# Patient Record
Sex: MALE | Race: OTHER | HISPANIC OR LATINO | URBAN - METROPOLITAN AREA
[De-identification: names, ages, dates, MRNs, and addresses within clinical notes are randomized per-mention and may not be internally consistent; named-entity substitution may affect disease eponyms.]

---

## 2022-11-29 ENCOUNTER — EMERGENCY (EMERGENCY)
Facility: HOSPITAL | Age: 37
LOS: 1 days | Discharge: HOME | End: 2022-11-29
Attending: STUDENT IN AN ORGANIZED HEALTH CARE EDUCATION/TRAINING PROGRAM | Admitting: STUDENT IN AN ORGANIZED HEALTH CARE EDUCATION/TRAINING PROGRAM

## 2022-11-29 VITALS — HEART RATE: 101 BPM

## 2022-11-29 VITALS
RESPIRATION RATE: 18 BRPM | OXYGEN SATURATION: 99 % | DIASTOLIC BLOOD PRESSURE: 85 MMHG | HEART RATE: 140 BPM | TEMPERATURE: 99 F | SYSTOLIC BLOOD PRESSURE: 133 MMHG

## 2022-11-29 LAB
FLUAV AG NPH QL: SIGNIFICANT CHANGE UP
FLUBV AG NPH QL: SIGNIFICANT CHANGE UP
RSV RNA NPH QL NAA+NON-PROBE: DETECTED
SARS-COV-2 RNA SPEC QL NAA+PROBE: SIGNIFICANT CHANGE UP

## 2022-11-29 PROCEDURE — 71046 X-RAY EXAM CHEST 2 VIEWS: CPT | Mod: 26

## 2022-11-29 PROCEDURE — 99284 EMERGENCY DEPT VISIT MOD MDM: CPT

## 2022-11-29 RX ORDER — ALBUTEROL 90 UG/1
1 AEROSOL, METERED ORAL ONCE
Refills: 0 | Status: COMPLETED | OUTPATIENT
Start: 2022-11-29 | End: 2022-11-29

## 2022-11-29 RX ADMIN — ALBUTEROL 1 PUFF(S): 90 AEROSOL, METERED ORAL at 11:13

## 2022-11-29 NOTE — ED PROVIDER NOTE - NSFOLLOWUPINSTRUCTIONS_ED_ALL_ED_FT
Bronchospasm    WHAT YOU NEED TO KNOW:    Bronchospasm is a narrowing of the airway that usually comes and goes. You may be at risk for bronchospasm if you have a chest cold or allergies. You may also be at risk if you are bothered by air pollution, certain medicines, cold, dry air, smoke, or strong odors. Exercise may worsen your symptoms. Bronchospasms may make it hard for you to breathe. Severe bronchospasm may become life-threatening.    DISCHARGE INSTRUCTIONS:    Call your local emergency number (911 in the ) if:    You have chest pain.    You have severe shortness of breath or trouble breathing.  Return to the emergency department if:    You cough or spit up blood.    You are short of breath.    You have blue fingernails or toenails.    Your heartbeat is fast or not even.  Call your doctor or pulmonologist if:    You have a fever.    You have a cough that will not go away.    Your wheezing worsens.    You have questions or concerns about your condition or care.  Medicines: You may need any of the following:    Bronchodilators help expand your airway for easier breathing. Some of these medicines may help prevent future spasms.    Inhaled steroids help reduce swelling in your airway and soothe your breathing. These are used for long-term control.    Anticholinergics help relax and open your airway.    Take your medicine as directed. Contact your healthcare provider if you think your medicine is not helping or if you have side effects. Tell him of her if you are allergic to any medicine. Keep a list of the medicines, vitamins, and herbs you take. Include the amounts, and when and why you take them. Bring the list or the pill bottles to follow-up visits. Carry your medicine list with you in case of an emergency.  Prevent bronchospasms:    Avoid triggers. Your healthcare provider can help you identify your triggers. You may need to keep a diary of your symptoms. Include where you were and what you were doing when symptoms started. Also include how long symptoms lasted. Make a note of anything that helped or made your symptoms worse. Bring your diary to visits with your healthcare provider. He or she may also recommend skin prick tests or other tests to help find triggers.    Warm up before you exercise. Ask your healthcare provider about the best exercise plan for you.    Keep your immune system healthy. Try to avoid people who are sick. Ask your healthcare provider about vaccines you may need. Vaccines help prevent certain infections that can cause breathing problems. Get a flu vaccine as soon as recommended each year, usually in September or October. Vaccines are also available to prevent COVID-19 and pneumonia. Your provider can tell you if you also need other vaccines, and when to get them.    Breathe through your nose when you are in cold, dry air or weather. This may help reduce lung irritation by warming the air before it reaches your lungs.  Follow up with your doctor or pulmonologist as directed: You may need more testing to find the cause of your condition. Write down your questions so you remember to ask them during your visits.

## 2022-11-29 NOTE — ED PROVIDER NOTE - OBJECTIVE STATEMENT
Patient 37-year-old male with past medical story of asthma who is presenting to the emergency department with the past 3 days of cough, mild sore throat.  His classmate said he sounded like garbage, currently there are paramedics and he was given Decadron and 3 Combivent.  He feels much better now.  Took a COVID test yesterday was negative.  No fevers.  No other acute medical complaints.  Has an albuterol inhaler but says it is running low

## 2022-11-29 NOTE — ED PROVIDER NOTE - PHYSICAL EXAMINATION
CONSTITUTIONAL: Non-toxic, non-diaphoretic, in no apparent distress  HEAD: Normocephalic; atruamatic  EYES: EOM intact   ENMT: External appears normal; normal oropharynx, moist  NECK: grossly normal active ROM,  CARD: No cyanosis, good peripheral perfusion, RRR, no MRG  RESP: Normal chest excursion with respiration; no increased work of breathing, Mild end expiratory wheezing present  ABD: non distended  EXT: moving all extremities, no gross disfigurement or asymmetry,  SKIN: Warm, dry, no rash  NEURO:  moving all extremities, no facial droop, no dysarthria      cn2-12 intact

## 2022-11-29 NOTE — ED ADULT TRIAGE NOTE - CHIEF COMPLAINT QUOTE
pt with asthma, c/o exacerbation x 3 day with flu like symptoms. given 3 duonebs and decadron by EMS.

## 2022-11-29 NOTE — ED PROVIDER NOTE - PATIENT PORTAL LINK FT
You can access the FollowMyHealth Patient Portal offered by [facility] by registering at the following website: [email or web address]. By joining [facility] FollowMyHealth portal, you will also be able to view your health information using other applications (apps) compatible with our system.

## 2022-11-29 NOTE — ED PROVIDER NOTE - CLINICAL SUMMARY MEDICAL DECISION MAKING FREE TEXT BOX
Will obtain x-ray to rule out pneumonia, send flu/COVID, will give albuterol MDI.  Already treated with steroids and patient is well-appearing anticipate discharge.

## 2022-11-29 NOTE — ED PROVIDER NOTE - NS ED ROS FT
CONSTITUTIONAL: No fever,  Pulmonary: ++++ cough  CARDIOVASCULAR: No chest pain,  GI: No abdominal pain  MUSKULOSKELETAL: No new pain in joints/muscles  ALL OTHER SYSTEMS NEGATIVE.

## 2023-02-21 ENCOUNTER — NON-APPOINTMENT (OUTPATIENT)
Age: 38
End: 2023-02-21